# Patient Record
Sex: MALE | Race: ASIAN | Employment: OTHER | URBAN - METROPOLITAN AREA
[De-identification: names, ages, dates, MRNs, and addresses within clinical notes are randomized per-mention and may not be internally consistent; named-entity substitution may affect disease eponyms.]

---

## 2024-05-22 ENCOUNTER — TELEPHONE (OUTPATIENT)
Age: 24
End: 2024-05-22

## 2024-05-22 NOTE — TELEPHONE ENCOUNTER
Pt called in to schedule in [place] for bloating/abd pain. Created pt's chart. He does not have any insurance. When checking possible dates, I could no longer hear the pt and call was disconnected.